# Patient Record
Sex: FEMALE | Race: BLACK OR AFRICAN AMERICAN | NOT HISPANIC OR LATINO | URBAN - METROPOLITAN AREA
[De-identification: names, ages, dates, MRNs, and addresses within clinical notes are randomized per-mention and may not be internally consistent; named-entity substitution may affect disease eponyms.]

---

## 2020-07-01 PROBLEM — M25.531 RIGHT WRIST PAIN: Status: ACTIVE | Noted: 2020-07-01

## 2023-06-20 ENCOUNTER — TELEPHONE (OUTPATIENT)
Dept: MATERNAL FETAL MEDICINE | Facility: CLINIC | Age: 28
End: 2023-06-20
Payer: MEDICAID

## 2023-06-20 NOTE — TELEPHONE ENCOUNTER
Message left for Jacey to call our office, [phone] when receives voice mail message.    Jacey called right back, informed of MFM appt on July 27 at 1200 PM for Type 2 DM in pregnancy; states will be OOT, rescheduled to July 30 at 1000 AM in agreement with her schedule. She was also instructed to call her glucose levels every week to Diana at [facility],  [phone], ext [phone] for management until [facility] appointment with US on July 30th. She agrees to call weekly with sugars and current medication dosages. Questions invited and answered.

## 2023-07-13 DIAGNOSIS — O24.112 PRE-EXISTING TYPE 2 DIABETES MELLITUS DURING PREGNANCY IN SECOND TRIMESTER: Primary | ICD-10-CM

## 2023-07-27 ENCOUNTER — OFFICE VISIT (OUTPATIENT)
Dept: MATERNAL FETAL MEDICINE | Facility: CLINIC | Age: 28
End: 2023-07-27
Payer: MEDICAID

## 2023-07-27 VITALS
OXYGEN SATURATION: 97 % | WEIGHT: 150 LBS | RESPIRATION RATE: 20 BRPM | HEART RATE: 96 BPM | BODY MASS INDEX: 32.46 KG/M2 | DIASTOLIC BLOOD PRESSURE: 68 MMHG | SYSTOLIC BLOOD PRESSURE: 106 MMHG

## 2023-07-27 DIAGNOSIS — O24.112 PRE-EXISTING TYPE 2 DIABETES MELLITUS DURING PREGNANCY IN SECOND TRIMESTER: ICD-10-CM

## 2023-07-27 PROCEDURE — 3078F PR MOST RECENT DIASTOLIC BLOOD PRESSURE < 80 MM HG: ICD-10-PCS | Mod: CPTII,S$GLB,, | Performed by: OBSTETRICS & GYNECOLOGY

## 2023-07-27 PROCEDURE — 99204 OFFICE O/P NEW MOD 45 MIN: CPT | Mod: TH,S$GLB,, | Performed by: OBSTETRICS & GYNECOLOGY

## 2023-07-27 PROCEDURE — 3074F PR MOST RECENT SYSTOLIC BLOOD PRESSURE < 130 MM HG: ICD-10-PCS | Mod: CPTII,S$GLB,, | Performed by: OBSTETRICS & GYNECOLOGY

## 2023-07-27 PROCEDURE — 3078F DIAST BP <80 MM HG: CPT | Mod: CPTII,S$GLB,, | Performed by: OBSTETRICS & GYNECOLOGY

## 2023-07-27 PROCEDURE — 99204 PR OFFICE/OUTPT VISIT, NEW, LEVL IV, 45-59 MIN: ICD-10-PCS | Mod: TH,S$GLB,, | Performed by: OBSTETRICS & GYNECOLOGY

## 2023-07-27 PROCEDURE — 3074F SYST BP LT 130 MM HG: CPT | Mod: CPTII,S$GLB,, | Performed by: OBSTETRICS & GYNECOLOGY

## 2023-07-27 PROCEDURE — 1159F PR MEDICATION LIST DOCUMENTED IN MEDICAL RECORD: ICD-10-PCS | Mod: CPTII,S$GLB,, | Performed by: OBSTETRICS & GYNECOLOGY

## 2023-07-27 PROCEDURE — 3008F BODY MASS INDEX DOCD: CPT | Mod: CPTII,S$GLB,, | Performed by: OBSTETRICS & GYNECOLOGY

## 2023-07-27 PROCEDURE — 76811 PR US, OB FETAL EVAL & EXAM, TRANSABDOM,FIRST GESTATION: ICD-10-PCS | Mod: S$GLB,,, | Performed by: OBSTETRICS & GYNECOLOGY

## 2023-07-27 PROCEDURE — 3008F PR BODY MASS INDEX (BMI) DOCUMENTED: ICD-10-PCS | Mod: CPTII,S$GLB,, | Performed by: OBSTETRICS & GYNECOLOGY

## 2023-07-27 PROCEDURE — 76811 OB US DETAILED SNGL FETUS: CPT | Mod: S$GLB,,, | Performed by: OBSTETRICS & GYNECOLOGY

## 2023-07-27 PROCEDURE — 1159F MED LIST DOCD IN RCRD: CPT | Mod: CPTII,S$GLB,, | Performed by: OBSTETRICS & GYNECOLOGY

## 2023-07-27 RX ORDER — INSULIN HUMAN 100 [IU]/ML
INJECTION, SOLUTION PARENTERAL
COMMUNITY
Start: 2023-06-30

## 2023-07-27 RX ORDER — ONDANSETRON 4 MG/1
TABLET, ORALLY DISINTEGRATING ORAL
COMMUNITY
Start: 2023-07-03

## 2023-07-27 RX ORDER — INSULIN HUMAN 100 [IU]/ML
INJECTION, SUSPENSION SUBCUTANEOUS
COMMUNITY
Start: 2023-07-20

## 2023-07-27 NOTE — LETTER
July 27, 2023        Mellisa Hadley CNM  [address] [facility] [address]             [facility] - Maternal Fetal Medicine  [address]  [place] [address]  Phone: [phone]  Fax: [phone]   Patient: Jacey Lee   MR Number: [id number]   YOB: 1995   Date of Visit: 7/27/2023       Dear Ms. Hadley:    Thank you for referring Jacey Lee to me for evaluation. Attached you will find relevant portions of my assessment and plan of care.    If you have questions, please do not hesitate to call me. I look forward to following Jacey Lee along with you.    Sincerely,      Anastasiya Martin, DO            CC  No Recipients    Enclosure

## 2023-07-27 NOTE — PROGRESS NOTES
Indication for consultation:  Intrauterine pregnancy at 14w6d  Diabetes mellitus type 2    Provider requesting consultation:  Mellisa Hadley CNM    Dear Mellisa,    Thank you very much for your referral of Jacey Lee who was seen for initial perinatology consultation and sonography today.  As you recall she is a 28 y.o.  at 14w6d here for consultation regarding her history of type 2 diabetes.  When discussing her management of her diabetes she has been calling in her blood sugars into the Maternal-Fetal Medicine office.  She is currently taking metformin 500 mg b.i.d..  Additionally she tells me that she wakes early in the morning around 430 checks her fasting blood sugar at that point in time this is usually not a true fast as she has had to go and drink juice in the middle of the night due to low blood sugars.  Then states that she drives approximately 30 minutes to work when she gets to work around 6:00 a.m. we will then go ahead and give herself both her regular and her NPH insulin in the morning at that time.  She does eat breakfast appropriately.  She will eat lunch at work and then come home when she comes home in the afternoon she then takes her metformin.  Usually will then eat dinner around 6:00 p.m. however does not take any insulin with dinner.  She then takes both her regular insulin and her NPH before she goes to bed at around 8:00 p.m..  She finds that she is dropping low in the middle of the night and will then have to drink juice to bring her blood sugars back up.    We had a long discussion that this is not the correct way to take her insulin as she is taking her regular insulin before she goes to bed and not with her dinner.  This is the cause of her having low blood sugars.  Due to the confusion and how dangerous insulin can be at this point in time I am going to ask her to stop taking metformin.  I have instructed her to now take her Insulin the correct way including 12 units of NPH in  the morning along with 18 units of regular with breakfast.  She should then take 12 units of regular at dinner and then 6 units of NPH at bedtime.    PMH:  Diabetes mellitus type 2, hyperlipidemia, bipolar disorder    Ob Hx:  G1 - current pregnancy    PSH:  Dilation and curettage, ablation of her endometriosis, drainage of cyst    Family hx:  Denies any family history of congenital anomalies or aneuploidy    SOC:  Denies any alcohol, tobacco, illicit drug use    Medications:  Prenatal vitamins, metformin, insulin    Allergies:  No known drug allergies    Review of systems: The patient denies any vaginal bleeding, loss of fluid or contraction pain today.  Vitals:    07/27/23 1207   BP: 106/68   Pulse: 96   Resp: 20     Physical exam:  Gen: WDWN in NAD  HEENT: WNL  Abdomen: Soft, non-tender, non-distended, gravid  Skin: No rash or jaundice  Extremities: Symmetric in size, no clubbing, cyanosis, or edema  Neuro: Grossly intact    Ultrasound:  Single intrauterine pregnancy measuring 14 weeks and 0 days by biometry.  The fetal heart rate is 156 beats per minute the fetus is in variable presentation with good fetal movement noted throughout.  The amnion is not fused with the chorion.  Bilateral adnexa not well seen.  Cervical length appears to be within normal limits.  Much of the anatomy was not well seen due to early gestational age however there were no structural anomalies or aneuploidy markers seen on ultrasound today.    Counseling:  Again we had a long conversation today regarding her insulin and the correct way to take it.  I am concerned that she is taking this incorrectly and this is what is causing her to have very low blood sugars in the middle of the night which can be extremely dangerous for the patient.  I have instructed her to take both of her NPH and her regular in the morning with breakfast however in the evening she should take regular only with dinner and NPH only at bedtime.  In the meantime I have  asked her to stop her metformin.  She is been instructed to call in her blood sugars next week to the Maternal-Fetal Medicine office if she continues to have low blood sugars we will then adjust her insulin.  We did discuss the importance of good glycemic control throughout this pregnancy.    Assessment:  Intrauterine pregnancy at 14w6d  Diabetes mellitus type 2    Recommendations:   Patient was instructed to keep all of her upcoming appointments with you and with maternal fetal medicine I would like for her to follow back up here in 4 weeks.  At this point in time and going to have her stop her metformin.  Please see above for her recommendations for her insulin.  She has been instructed to continue to send those into the Maternal-Fetal Medicine office on a weekly basis.  Growth ultrasounds are indicated and so we will have her follow back up here in 4 weeks.  As we worked our way throughout the pregnancy do recommend  testing to begin in your office at 32 weeks.  Delivery recommendations will be given later on in the pregnancy.  Recommend a TSH within the past year.  Recommend some form of evaluation of proteinuria either with a 24 hour urine protein or protein creatinine ratio.  Do recommend a low-dose aspirin to help reduce her risk of preeclampsia.  I have also instructed her to make sure that she is had an eye appointment in the last year what she has.    Thanks once again for allowing us to participate in the care of your patients.  If you have any questions about today's consultation feel free to contact me or my partners at [phone].    Sincerely,    Anastasiya Martin DO.  Maternal-Fetal Medicine     Total time personally involved in this patient's care was 60 minutes.

## 2023-07-27 NOTE — PROGRESS NOTES
Jacey is here for initial MFM consultation for Type 2 diabetes in pregnancy, referred by Mellisa Hadley CNM at Dr. Glenn Jr's office. She said Dr. Carol De Los Santos in [place] manages her diabetes prior to pregnancy, but not now that she is pregnant. She states her last HgbA1c was down to 7.3.    She is not feeling fetal movement.    Jacey denies vaginal bleeding, loss of fluid, recurrent contractions.    She did not bring her glucose log today but has been calling sugars to Diana at the [facility] office for a few weeks.  She is taking medications for control, Metformin 500 mg PO BID, pt reports that she takes it about noon and again around 5PM.    Insulin:     12 units N and 18 units R before breakfast              6 units N and 12 units R after dinner.        Vitals:    07/27/23 1207   BP: 106/68   Pulse: 96   Resp: 20   SpO2: 97%   Weight: 68 kg (150 lb)     BMI:                    32.46 kg/m^2

## 2023-08-15 DIAGNOSIS — O24.112 PRE-EXISTING TYPE 2 DIABETES MELLITUS DURING PREGNANCY IN SECOND TRIMESTER: Primary | ICD-10-CM

## 2023-08-28 ENCOUNTER — PROCEDURE VISIT (OUTPATIENT)
Dept: MATERNAL FETAL MEDICINE | Facility: CLINIC | Age: 28
End: 2023-08-28
Payer: MEDICAID

## 2023-08-28 VITALS
SYSTOLIC BLOOD PRESSURE: 106 MMHG | WEIGHT: 152 LBS | RESPIRATION RATE: 20 BRPM | OXYGEN SATURATION: 100 % | HEART RATE: 104 BPM | BODY MASS INDEX: 32.89 KG/M2 | DIASTOLIC BLOOD PRESSURE: 62 MMHG

## 2023-08-28 DIAGNOSIS — O24.112 PRE-EXISTING TYPE 2 DIABETES MELLITUS DURING PREGNANCY IN SECOND TRIMESTER: ICD-10-CM

## 2023-08-28 PROCEDURE — 76816 OB US FOLLOW-UP PER FETUS: CPT | Mod: 26,,, | Performed by: OBSTETRICS & GYNECOLOGY

## 2023-08-28 PROCEDURE — 99213 OFFICE O/P EST LOW 20 MIN: CPT | Mod: 25,TH,S$GLB, | Performed by: OBSTETRICS & GYNECOLOGY

## 2023-08-28 PROCEDURE — 99213 PR OFFICE/OUTPT VISIT, EST, LEVL III, 20-29 MIN: ICD-10-PCS | Mod: 25,TH,S$GLB, | Performed by: OBSTETRICS & GYNECOLOGY

## 2023-08-28 PROCEDURE — 76816 PR  US,PREGNANT UTERUS,F/U,TRANSABD APP: ICD-10-PCS | Mod: 26,,, | Performed by: OBSTETRICS & GYNECOLOGY

## 2023-08-28 RX ORDER — VITAMIN A, VITAMIN C, VITAMIN D, VITAMIN E, THIAMINE, RIBOFLAVIN, NIACIN, VITAMIN B6, FOLIC ACID, VITAMIN B12, CALCIUM, IRON, ZINC, COPPER 4000; 120; 400; 22; 1.84; 3; 20; 10; 1; 12; 200; 27; 25; 2 [IU]/1; MG/1; [IU]/1; [IU]/1; MG/1; MG/1; MG/1; MG/1; MG/1; UG/1; MG/1; MG/1; MG/1; MG/1
1 TABLET ORAL
COMMUNITY
Start: 2023-08-21

## 2023-08-28 NOTE — PROGRESS NOTES
Indication for follow up consultation:  Intrauterine pregnancy at 19w2d  Diabetes mellitus type 2, on insulin    Provider requesting consultation:  Mellisa Hadley CNM    Dear Mellisa,    I had the pleasure of seeing Jacey Lee for follow up consultation and sonography today.  Thank you again for allowing us to assist in her care.  As you recall she is a 28 y.o.  at 19w2d.  Dr. Martin counseled her in depth about the management of her diabetes.  She is now splitting the evening regular and bedtime NPH.  She send her sugars to us every week and did so this past Thursday.  We have made adjustments as she is still having some fasting hyperglycemia.  As she is still snack some at night, she wakes up hungry.  I do not have her most recent sugars with me.  Currently she is on 18 units of NPH plus 12 units of regular with breakfast.  She then administer 16 units of regular with dinner and 14 units of NPH at bedtime.    She reports fetal movement and denies any vaginal bleeding, leakage of fluid, or cramping.    Review of systems: The patient denies any vaginal bleeding, loss of fluid or contraction pain today.  Vitals:    23 1110   BP: 106/62   Pulse: 104   Resp: 20   SpO2: 100%   Weight: 68.9 kg (152 lb)     Body mass index is 32.89 kg/m².      Physical exam:  Gen: WDWN in NAD  HEENT: WNL  Abdomen: Soft, non-tender, non-distended  Skin: No rash or jaundice  Extremities: Symmetric in size, no clubbing, cyanosis, or edema  Neuro: Grossly intact    Ultrasound:  A repeat detailed fetal anatomical survey was completed once again today.  We demonstrated a Bryant in breech presentation.  Heart rate 149 beats per minute.  Amniotic fluid volume is normal.  Please see attached report for biometry.  Baby is measuring 18 weeks and 3 days or 9 oz. detailed fetal anatomic survey was performed.  The left ventricular outflow tract and cardiac arches are still suboptimal will we could see the remainder of the anatomy is  reassuring.  Please SEE ATTACHED REPORT for full details.      Assessment:  Intrauterine pregnancy at 19w2d  Insulin-requiring type 2 diabetes    Recommendations:   We still can not clear all the fetal heart as of yet.  We will continue to follow her here with next visit around 24-25 weeks.  She is been sending her sugars to us in [place] and we have made adjustments to her insulin and will continue to do so.  She had questions about diet and insulin today and I addressed all them.      We greatly appreciate you allowing us to assist in her care.  Please call with any questions or concerns.    Sincerely,    Nickolas Shrestha Jr., M.D.  Maternal Fetal Medicine    Total time personally involved in this patient's care was 25 minutes.

## 2023-08-28 NOTE — PROGRESS NOTES
Jacey is here for followup [facility] consultation for Type 2 diabetes in pregnancy, referred by Mellisa Hadley CNM.    She is feeling fetal movement.    Jacey denies vaginal bleeding, loss of fluid, recurrent contractions.    She did not bring her glucose log today, she calls her levels to the [place] office every Thursday.  She is taking medications for control,    Insulin:     18 units N and 12 units R before breakfast       16 units R before dinner       14 units N at HS    She is taking ASA 81 mg PO daily.    Vitals:    08/28/23 1110   BP: 106/62   Pulse: 104   Resp: 20   SpO2: 100%   Weight: 68.9 kg (152 lb)     BMI:                    32.89 kg/m^2

## 2023-08-28 NOTE — LETTER
August 28, 2023        Mellisa Hadley CNM  [address] [facility] [address]             [facility] - Maternal Fetal Medicine  [address]  [place] [address]  Phone: [phone]  Fax: [phone]   Patient: Jacey Lee   MR Number: [id number]   YOB: 1995   Date of Visit: 8/28/2023       Dear Ms Hadley:    Thank you for referring Jacey Lee to me for evaluation. Below are the relevant portions of my assessment and plan of care.            If you have questions, please do not hesitate to call me. I look forward to following Jacey along with you.    Sincerely,      Nickolas Shrestha Jr., MD           CC  No Recipients

## 2023-09-25 DIAGNOSIS — O24.112 PRE-EXISTING TYPE 2 DIABETES MELLITUS DURING PREGNANCY IN SECOND TRIMESTER: Primary | ICD-10-CM

## 2023-10-05 ENCOUNTER — PROCEDURE VISIT (OUTPATIENT)
Dept: MATERNAL FETAL MEDICINE | Facility: CLINIC | Age: 28
End: 2023-10-05
Payer: MEDICAID

## 2023-10-05 VITALS
BODY MASS INDEX: 36.14 KG/M2 | WEIGHT: 167 LBS | SYSTOLIC BLOOD PRESSURE: 108 MMHG | OXYGEN SATURATION: 99 % | DIASTOLIC BLOOD PRESSURE: 71 MMHG | RESPIRATION RATE: 18 BRPM | HEART RATE: 101 BPM

## 2023-10-05 DIAGNOSIS — O24.112 PRE-EXISTING TYPE 2 DIABETES MELLITUS DURING PREGNANCY IN SECOND TRIMESTER: ICD-10-CM

## 2023-10-05 PROCEDURE — 99213 OFFICE O/P EST LOW 20 MIN: CPT | Mod: 25,TH,S$GLB, | Performed by: OBSTETRICS & GYNECOLOGY

## 2023-10-05 PROCEDURE — 76820 UMBILICAL ARTERY ECHO: CPT | Mod: S$GLB,,, | Performed by: OBSTETRICS & GYNECOLOGY

## 2023-10-05 PROCEDURE — 76816 PR  US,PREGNANT UTERUS,F/U,TRANSABD APP: ICD-10-PCS | Mod: S$GLB,,, | Performed by: OBSTETRICS & GYNECOLOGY

## 2023-10-05 PROCEDURE — 76816 OB US FOLLOW-UP PER FETUS: CPT | Mod: S$GLB,,, | Performed by: OBSTETRICS & GYNECOLOGY

## 2023-10-05 PROCEDURE — 76820 PR US, OB DOPPLER, FETAL UMBILICAL ARTERY ECHO: ICD-10-PCS | Mod: S$GLB,,, | Performed by: OBSTETRICS & GYNECOLOGY

## 2023-10-05 PROCEDURE — 99213 PR OFFICE/OUTPT VISIT, EST, LEVL III, 20-29 MIN: ICD-10-PCS | Mod: 25,TH,S$GLB, | Performed by: OBSTETRICS & GYNECOLOGY

## 2023-10-05 RX ORDER — ASPIRIN 81 MG/1
81 TABLET ORAL DAILY
COMMUNITY

## 2023-10-05 NOTE — PROGRESS NOTES
Follow-up [facility] consultation note:  Avelino Lobato MD    Reason for consultation:     1. Pregnancy at 25 weeks' gestation  2. Type 2 diabetes requiring insulin      Dear Mellisa Hadley CNM    Today, I had the opportunity to see your patient in follow-up at the [facility].  I greatly appreciate your request for follow-up imaging and consultation.  Your patient is a 28-year-old primigravida with a due date of January 19, 2024.  Previously the patient saw Dr. Martin.  Her notes have been reviewed.  Her metformin has been discontinued.  Glycemic control is exclusively with insulin.  Currently, the patient takes 18 units of NPH and 12 units of regular with breakfast.  At dinnertime she takes 16 units of regular.  At bedtime she takes 14 units of NPH.  Control appears adequate.  The patient called her blood glucose levels to our office yesterday and her insulin dose was adjusted.  Our most recent hemoglobin A1c was normal at 5.2 %.    Physical examination    General:  This is a well-developed well-nourished female in no apparent distress.  She appears healthy.  Vital signs:  Blood pressure 108/70, pulse 101, respirations 18, weight 167 lb  HEENT:  Grossly within normal limits.  There is no facial edema.  The sclera appears normal.  Abdomen:  Benign exam.  The uterus is nontender to palpation.  The uterus is appropriate size.  Extremities:  No ankle edema is palpable.  Skin:  There is no rash or lesions.  Neuro:  Grossly intact  Psych:  The patient is appropriate for both mood and affect.      Imaging:  [facility] imaging was repeated today.  A full ultrasound report has been created in the [employer] system and a copy will be placed in the EMR and will also be forwarded to you separately.  In summary, overall the anatomy appears normal.  The baby was spine up and I did get a brief view of the LVOT.  I would still call it suboptimal and think that we should take another look at the outflow tracts when the  patient returns.  Additionally, the baby is lagging in growth now.  The abdominal circumference is small.  The overall growth is at the 19th percentile.    Counseling:  Your patient was counseled that her glycemic control appears good.  I made no changes to her insulin dose.  She was encouraged to keep up the good work.  The patient is informed that her baby is lagging in growth.  Specifically, the abdominal circumference.  At the present time this is not concerning as the remainder of the assessment the baby including fluid and movement is all normal.  She was informed that we will see her back in 3 weeks for her next assessment.    Impression:  Maternal type 2 diabetes which is adequately controlled.  Our fetal assessment is reassuring from an anatomic perspective.  However there is evidence of fetal growth restriction based upon a small abdominal circumference.  Increased surveillance is warranted.    Recommendations:    1. With your permission we will see the patient back in 3 weeks to reassess fetal growth and development.  If the baby is still small then we will do umbilical artery Doppler analysis.  At that time we will reassess the fetal cardiac outflow tracts.  2. The patient was encouraged to keep up the excellent work on her glycemic control.    Please feel free to call me if you have any questions about the care of your patient.  The [facility]'s [facility] MFM group can be reached 24 hours a day at [phone].  I greatly appreciate the opportunity to participate in the care of your patient.    Sincerely, Avelino Lobato MD

## 2023-10-05 NOTE — LETTER
October 5, 2023        Mellisa Hadley CNM  [address] [facility] [address]             [facility] - Maternal Fetal Medicine  [address]  [place] [address]  Phone: [phone]  Fax: [phone]   Patient: Jacey Lee   MR Number: [id number]   YOB: 1995   Date of Visit: 10/5/2023       Dear Ms. Arce:    Thank you for referring Jacey Lee to me for evaluation. Below are the relevant portions of my assessment and plan of care.            If you have questions, please do not hesitate to call me. I look forward to following Jacey along with you.    Sincerely,      Avelino Kelly MD           CC  No Recipients

## 2023-10-05 NOTE — PROGRESS NOTES
Jacey is here for followup [facility] consultation for type 2 diabetes in pregnancy, referred by Mellisa Hadley CNM.    She is feeling fetal movement.    Jacey denies vaginal bleeding, loss of fluid, recurrent contractions.    She did not bring her glucose log today; she calls the levels to the [place] office weekly.  She is taking medications for control,    Insulin:     18 units N and 12 units R before breakfast       16 units R before dinner       14 units N at HS    She also takes ASA 81 mg PO daily and Zofran prn.    She states her most recent HgbA1c was 5.2.    Vitals:    10/05/23 0932   BP: 108/71   Pulse: 101   Resp: 18   SpO2: 99%   Weight: 75.8 kg (167 lb)     BMI:                    36.14 kg/m^2

## 2023-10-17 DIAGNOSIS — O24.112 PRE-EXISTING TYPE 2 DIABETES MELLITUS DURING PREGNANCY IN SECOND TRIMESTER: Primary | ICD-10-CM

## 2023-10-23 ENCOUNTER — PROCEDURE VISIT (OUTPATIENT)
Dept: MATERNAL FETAL MEDICINE | Facility: CLINIC | Age: 28
End: 2023-10-23
Payer: MEDICAID

## 2023-10-23 VITALS
BODY MASS INDEX: 36.14 KG/M2 | DIASTOLIC BLOOD PRESSURE: 68 MMHG | WEIGHT: 167 LBS | SYSTOLIC BLOOD PRESSURE: 98 MMHG | OXYGEN SATURATION: 99 % | HEART RATE: 100 BPM | RESPIRATION RATE: 18 BRPM

## 2023-10-23 DIAGNOSIS — O24.112 PRE-EXISTING TYPE 2 DIABETES MELLITUS DURING PREGNANCY IN SECOND TRIMESTER: ICD-10-CM

## 2023-10-23 PROCEDURE — 76816 OB US FOLLOW-UP PER FETUS: CPT | Mod: S$GLB,,, | Performed by: OBSTETRICS & GYNECOLOGY

## 2023-10-23 PROCEDURE — 99213 OFFICE O/P EST LOW 20 MIN: CPT | Mod: 25,TH,S$GLB, | Performed by: OBSTETRICS & GYNECOLOGY

## 2023-10-23 PROCEDURE — 99213 PR OFFICE/OUTPT VISIT, EST, LEVL III, 20-29 MIN: ICD-10-PCS | Mod: 25,TH,S$GLB, | Performed by: OBSTETRICS & GYNECOLOGY

## 2023-10-23 PROCEDURE — 76816 PR  US,PREGNANT UTERUS,F/U,TRANSABD APP: ICD-10-PCS | Mod: S$GLB,,, | Performed by: OBSTETRICS & GYNECOLOGY

## 2023-10-23 NOTE — PROGRESS NOTES
Jacey is here for followup [facility] consultation for Type 2 diabetes in pregnancy, referred by Mellisa Hadley CNM.    She is feeling fetal movement.    Jacey denies vaginal bleeding, loss of fluid, recurrent contractions.    She did not bring her glucose log today, she calls levels to [place] office on Thursdays.  She is taking medications for control, no changes from last visit,     Insulin:     18 units N and 12 units R before breakfast       16 units R before dinner       14 units N at HS    She is also taking ASA 81 mg PO daily.    Vitals:    10/23/23 0906   BP: 98/68   Pulse: 100   Resp: 18   SpO2: 99%   Weight: 75.8 kg (167 lb)     BMI:                    36.14 kg/m^2

## 2023-10-23 NOTE — PROGRESS NOTES
Follow-up [facility] consultation note:  Avelino Lobato MD    Reason for consultation:     1. Pregnancy at 27 weeks' gestation requiring insulin  2. Type 2 diabetes  3. Normal fetal growth but abdominal circumference at the 8th percentile     Dear Mellisa Hadley CNM    Today, I had the opportunity to see your patient in follow-up at the [facility].  I greatly appreciate your request for follow-up imaging and consultation.  Your patient is a 28-year-old primigravida with a due date of January 19, 2024.  Since her last visit your patient done well.  She did call her glucose log book in to our office in [place] on Thursday.  No changes in her insulin regimen were made.  In the morning the patient takes 18 units of NPH and 12 units regular.  At dinnertime she takes 16 units of regular.  At bedtime she takes 14 units of NPH.    Physical examination    Vital signs:  Blood pressure 108/70, pulse 101, respirations 18, weight 167 lb, pulse oximetry 99%    HEENT:  Grossly within normal limits.  There is no facial edema.  The sclera appears normal.  Abdomen:  Benign exam.  The uterus is nontender to palpation.  The uterus is appropriate size.  Extremities:  No ankle edema is palpable.  Skin:  There is no rash or lesions.  Neuro:  Grossly intact  Psych:  The patient is appropriate for both mood and affect.      Imaging:  [facility] imaging was repeated today.  A full ultrasound report has been created in the [facility] [facility] system and a copy will be placed in the EMR and will also be forwarded to you separately.  In summary, imaging today shows persistent normal overall growth with a small abdominal circumference.  However, there is no evidence of fetal compromise.  The fluid volume is normal.  No anatomic abnormality is seen.  We still could not adequately see the left ventricular outflow tract but no obvious fetal cardiac abnormalities noted.    Counseling:  The patient was counseled overall the imaging of the fetus  is reassuring.  She is aware that the abdominal circumference is lagging minimally.  I think this is a constitutional issue not a pathologic issue.  She was reassured as the remainder of the study is without any abnormality whatsoever.  She was curious about site of delivery.  I informed the patient this time I did not think there was any need for delivery outside of her home hospital.  Something in the future would have to change significantly for us to recommend delivery either in [place] or [place].  The patient was encouraged to remain on her diabetic program and to take her insulin as prescribed.    Impression:  The clinical situation stable with normal overall growth but the fetus has the diagnosis of fetal growth restriction based upon a small AC.  Interval growth is normal.  The fluid volume is normal.  The maternal diabetic status is stable and indicates adequate control.    Recommendations:    1. With your permission we will see the patient back here in 3 weeks.    2. The patient is encouraged to continue with her diabetic program and also to increase activity    Please feel free to call me if you have any questions about the care of your patient.  [facility] can be reached 24 hours a day at [phone].  I greatly appreciate the opportunity to participate in the care of your patient.    Sincerely, Avelino Lobato MD

## 2023-11-07 DIAGNOSIS — O24.113 PREGNANCY WITH TYPE 2 DIABETES MELLITUS IN THIRD TRIMESTER: Primary | ICD-10-CM

## 2023-11-16 ENCOUNTER — CLINICAL SUPPORT (OUTPATIENT)
Dept: MATERNAL FETAL MEDICINE | Facility: CLINIC | Age: 28
End: 2023-11-16
Payer: MEDICAID

## 2023-11-16 VITALS
WEIGHT: 172 LBS | SYSTOLIC BLOOD PRESSURE: 108 MMHG | OXYGEN SATURATION: 98 % | DIASTOLIC BLOOD PRESSURE: 66 MMHG | BODY MASS INDEX: 37.22 KG/M2 | HEART RATE: 96 BPM | RESPIRATION RATE: 20 BRPM

## 2023-11-16 DIAGNOSIS — O36.5930 INTRAUTERINE GROWTH RESTRICTION (IUGR) AFFECTING CARE OF MOTHER, THIRD TRIMESTER, SINGLE GESTATION: Primary | ICD-10-CM

## 2023-11-16 DIAGNOSIS — O24.113 PREGNANCY WITH TYPE 2 DIABETES MELLITUS IN THIRD TRIMESTER: ICD-10-CM

## 2023-11-16 DIAGNOSIS — O24.113 PREGNANCY WITH TYPE 2 DIABETES MELLITUS IN THIRD TRIMESTER: Primary | ICD-10-CM

## 2023-11-16 PROCEDURE — 76819 FETAL BIOPHYS PROFIL W/O NST: CPT | Mod: NDTC,,, | Performed by: OBSTETRICS & GYNECOLOGY

## 2023-11-16 PROCEDURE — 76816 OB US FOLLOW-UP PER FETUS: CPT | Mod: NDTC,,, | Performed by: OBSTETRICS & GYNECOLOGY

## 2023-11-16 PROCEDURE — 76816 PR  US,PREGNANT UTERUS,F/U,TRANSABD APP: ICD-10-PCS | Mod: NDTC,,, | Performed by: OBSTETRICS & GYNECOLOGY

## 2023-11-16 PROCEDURE — 76819 PR US, OB, FETAL BIOPHYSICAL, W/O NST: ICD-10-PCS | Mod: NDTC,,, | Performed by: OBSTETRICS & GYNECOLOGY

## 2023-11-16 PROCEDURE — 76820 PR US, OB DOPPLER, FETAL UMBILICAL ARTERY ECHO: ICD-10-PCS | Mod: NDTC,,, | Performed by: OBSTETRICS & GYNECOLOGY

## 2023-11-16 PROCEDURE — 76820 UMBILICAL ARTERY ECHO: CPT | Mod: NDTC,,, | Performed by: OBSTETRICS & GYNECOLOGY

## 2023-11-16 NOTE — PROGRESS NOTES
Follow-up [facility] consultation note via Telemedicine:  Anastasiya Martin DO    Reason for consultation:     1. Pregnancy at 30 weeks and 6 days  2. Type 2 diabetes on Insulin  3.  Intrauterine growth restriction based on AC < 10th percentile    Dear Mellisa Hadley CNM    Today, I had the opportunity to see your patient in follow-up at the [facility] via telemedicine and I was located at [facility] in [place].  I greatly appreciate your request for follow-up imaging and consultation.  Your patient is a 28-year-old primigravida with a due date of January 19, 2024.  Since her last visit your patient done well.  She did call her glucose log book in to our office in [place] on Thursday.  No changes in her insulin regimen were made.  In the morning the patient takes 18 units of NPH and 12 units regular.  At dinnertime she takes 16 units of regular.  At bedtime she takes 14 units of NPH.    Physical examination  Vitals:    11/16/23 0855   BP: 108/66   Pulse: 96   Resp: 20    Physical exam:  Gen: WDWN in NAD  HEENT: WNL  Abdomen: gravid  Skin: No rash or jaundice  Extremities: Symmetric in size, no clubbing, cyanosis, or edema  Neuro: Grossly intact  Exam via Telemedicine    Imaging:  Single intrauterine pregnancy measuring 30 weeks and 6 days with an estimated fetal weight of 1487gg.  This is consistent with a previously determined CHELLY and is at the 21st percentile, however the abdominal circumference is at the 6th percentile.  The fetus is in the breech presentation.  The placenta is anterior without evidence of previa.  The amniotic fluid is normal with an CANDICE of 16cm.  Completion of the anatomical survey was performed and there are no structural anomalies or aneuploidy on ultrasound today.  Additionally, a BPP was performed that was 8/8. Umbilical artery dopplers were also performed that were normal without any evidence of absent or reversed end diastolic flow.       Counseling:  The patient was counseled  overall the imaging of the fetus is reassuring.  She is aware that the abdominal circumference is lagging minimally.  I think this is a constitutional issue not a pathologic issue and there has been interval fetal growth which is also reassuring. She has questions today regarding timing of delivery and told her that we would reassess growth at her upcoming visit, however I would anticipate delivery between 38 0/7 weeks to 39 6/7 weeks.        Recommendations:    1. Patient was instructed to keep all of her upcoming appointments with you and with Maternal Fetal Medicine.  I have asked her to return here in 3 weeks.     2. I recommend weekly  testing to be performed in your office beginning next week with either weekly BPP or twice weekly NST.     3. Blood sugars goals are for her fastings to be less than 95 and and her 2 hr postprandials to be less than 120.  She has been instructed to continue to send them into the MF office on a weekly basis.  She overall shows good control and no adjustments were made to her insulin today.      4.  Delivery recommendations will be given at her next appointment, however we discussed I would anticipate delivery between 38 0/7 weeks and 39 6/7 weeks.      Please feel free to call me if you have any questions about the care of your patient.  The [facility] group can be reached 24 hours a day at [phone].  I greatly appreciate the opportunity to participate in the care of your patient.    Sincerely,     Anastasiya Martin,     Telemedicine Consultation today was performed with the patient located at [facility] in [place], [place] and I was located at the Maternal Fetal Medicine office at [facility] in [place], [place].

## 2023-11-16 NOTE — PROGRESS NOTES
Jacey is here for followup [facility] consultation for Type 2 diabetes in pregnancy, referred by Mellisa Hadley CNM.    She is feeling fetal movement.    Jacey denies vaginal bleeding, loss of fluid, recurrent contractions.    She did not bring her glucose log today, she calls them to [facility] at [facility] every Thursday.  She is taking medications for control,    Insulin:     18 units N and 12 units R before breakfast       16 units R before dinner       14 units N at HS    She also takes ASA 81 mg PO daily.    Vitals:    11/16/23 0855   BP: 108/66   Pulse: 96   Resp: 20   SpO2: 98%   Weight: 78 kg (172 lb)     BMI:                    37.22 kg/m^2

## 2023-12-07 ENCOUNTER — PROCEDURE VISIT (OUTPATIENT)
Dept: MATERNAL FETAL MEDICINE | Facility: CLINIC | Age: 28
End: 2023-12-07
Payer: MEDICAID

## 2023-12-07 VITALS
OXYGEN SATURATION: 98 % | HEART RATE: 96 BPM | DIASTOLIC BLOOD PRESSURE: 66 MMHG | BODY MASS INDEX: 37.22 KG/M2 | SYSTOLIC BLOOD PRESSURE: 108 MMHG | WEIGHT: 172 LBS | RESPIRATION RATE: 20 BRPM

## 2023-12-07 DIAGNOSIS — O24.113 PREGNANCY WITH TYPE 2 DIABETES MELLITUS IN THIRD TRIMESTER: ICD-10-CM

## 2023-12-07 PROCEDURE — 76820 PR US, OB DOPPLER, FETAL UMBILICAL ARTERY ECHO: ICD-10-PCS | Mod: S$GLB,,, | Performed by: OBSTETRICS & GYNECOLOGY

## 2023-12-07 PROCEDURE — 99213 OFFICE O/P EST LOW 20 MIN: CPT | Mod: 25,TH,S$GLB, | Performed by: OBSTETRICS & GYNECOLOGY

## 2023-12-07 PROCEDURE — 76816 OB US FOLLOW-UP PER FETUS: CPT | Mod: S$GLB,,, | Performed by: OBSTETRICS & GYNECOLOGY

## 2023-12-07 PROCEDURE — 76820 UMBILICAL ARTERY ECHO: CPT | Mod: S$GLB,,, | Performed by: OBSTETRICS & GYNECOLOGY

## 2023-12-07 PROCEDURE — 99213 PR OFFICE/OUTPT VISIT, EST, LEVL III, 20-29 MIN: ICD-10-PCS | Mod: 25,TH,S$GLB, | Performed by: OBSTETRICS & GYNECOLOGY

## 2023-12-07 PROCEDURE — 76819 FETAL BIOPHYS PROFIL W/O NST: CPT | Mod: S$GLB,,, | Performed by: OBSTETRICS & GYNECOLOGY

## 2023-12-07 PROCEDURE — 76816 PR  US,PREGNANT UTERUS,F/U,TRANSABD APP: ICD-10-PCS | Mod: S$GLB,,, | Performed by: OBSTETRICS & GYNECOLOGY

## 2023-12-07 PROCEDURE — 76819 PR US, OB, FETAL BIOPHYSICAL, W/O NST: ICD-10-PCS | Mod: S$GLB,,, | Performed by: OBSTETRICS & GYNECOLOGY

## 2023-12-07 RX ORDER — FERROUS SULFATE TAB 325 MG (65 MG ELEMENTAL FE) 325 (65 FE) MG
TAB ORAL 2 TIMES DAILY
COMMUNITY
Start: 2023-11-30

## 2023-12-07 NOTE — PROGRESS NOTES
Jacey is here for followup [facility] consultation for type 2 diabetes in pregnancy, referred by Mellisa Hadley CNM.    She is feeling fetal movement.    Jacey denies vaginal bleeding, loss of fluid, recurrent contractions.    She did not bring her glucose log today; she called them to the [place] office this AM.  She is taking medications for control,    Insulin:     18 units N and 12 units R before breakfast       16 units R before dinner       14 units N at HS    She also takes ASA 81 mg PO daily, and recently added Fe supplement daily.      Vitals:    12/07/23 1052   BP: 108/66   Pulse: 96   Resp: 20   SpO2: 98%   Weight: 78 kg (172 lb)     BMI:                    37.22 kg/m^2

## 2023-12-07 NOTE — LETTER
December 7, 2023        Mellisa Hadley CNM  [address]   [place] [address]             [place] Maternal Fetal Medicine  [address]  [place] [address]  Phone: [phone]  Fax: [phone]   Patient: Jacey Lee   MR Number: [id number]   YOB: 1995   Date of Visit: 12/7/2023       Dear Ms Hadley:    Thank you for referring Jacey Lee to me for evaluation. Below are the relevant portions of my assessment and plan of care.            If you have questions, please do not hesitate to call me. I look forward to following Jacey along with you.    Sincerely,      Avelino Kelly MD           CC  No Recipients

## 2023-12-07 NOTE — PROGRESS NOTES
Follow-up [facility] consultation note:  Avelino Lobato MD    Reason for consultation:     1. Pregnancy at 34 weeks' gestation  2. Type 2 diabetes requiring insulin  3. Fetal growth restriction based upon abdominal circumference    Dear Cirilo,    Today, I had the opportunity to see your patient in follow-up at the [facility].  I greatly appreciate your request for follow-up imaging and consultation.  Your patient is a 28-year-old primigravida with a due date of January 19, 2024.  Today your patient is doing well without somatic complaints.  Specifically, she has no musculoskeletal aches and pains.  Obstetrically she denies vaginal bleeding, leakage of fluid, uterine contractions.  Fetal movement is perceived daily.    Physical examination    General:  This is a well-developed well-nourished female in no apparent distress.  Vital signs:  Blood pressure 108/66, pulse 99, respirations 18, pulse oximetry 97%, weight 174 lb  HEENT:  Grossly within normal limits.  There is no facial edema.  The sclera appears normal.  Abdomen:  Benign exam.  The uterus is nontender to palpation.  The uterus is appropriate size.  Extremities:  No ankle edema is palpable.  Skin:  There is no rash or lesions.  Neuro:  Grossly intact  Psych:  The patient is appropriate for both mood and affect.      Imaging:  [facility] imaging was repeated today.  A full ultrasound report has been created in the Sem[facility] system and a copy will be placed in the EMR and will also be forwarded to you separately.  In summary, imaging today does not show any evidence of fetal compromise as the biophysical profile and umbilical artery Doppler analysis were both normal.  Additionally, the fluid volume is normal.  No obvious anatomic abnormality is seen on today's imaging.  Growth shows normal overall growth but a lagging abdominal circumference.    Impression:  Today's assessment is reassuring in my opinion.  The patient requires insulin for her type 2  diabetes.  Her glycemic control appears adequate.  The patient is compliant with calling in her blood glucose levels to our office on Thursdays.  She should continue to do so.  Today's fetal assessment is reassuring with regards to growth, fluid, and anatomy.  Fetal physiologic testing was normal.    Recommendations:    1. I agree with the plan for the goal to be 38 weeks gestation for time of delivery.  I would feel comfortable with 38 weeks 0 days plus or minus a day or 2    2. No follow-up in the [facility] is scheduled.  However, we would be happy to see your patient back if you think we can be of further assistance    3. Weekly fetal assessment with a biophysical profile and umbilical artery Dopplers are suggested.  I would advise a growth study in your office in 3 weeks as well.  If any of those ultrasounds cause concern then delivery sooner than 38 weeks may be required.      Please feel free to call me if you have any questions about the care of your patient.  The [facility] group can be reached 24 hours a day at [phone].  I greatly appreciate the opportunity to participate in the care of your patient.    Sincerely, Avelino Lobato MD

## 2024-02-29 NOTE — LETTER
November 16, 2023        Mellisa Hadley CNM  [address] [facility] [address]             [place] - Maternal Fetal Medicine  [address]  [place] [address]  Phone: [phone]  Fax: [phone]   Patient: Jacey Lee   MR Number: [id number]   YOB: 1995   Date of Visit: 11/16/2023       Dear Ms. Hadley:    Thank you for referring Jacey Lee to me for evaluation. Below are the relevant portions of my assessment and plan of care.            If you have questions, please do not hesitate to call me. I look forward to following Jacey along with you.    Sincerely,      Anastasiya Martin, DO        CC  No Recipients      Pt will not have any falls during outpatient treatment. Pt will be assisted on and off scale to prevent falls.